# Patient Record
Sex: MALE | Race: BLACK OR AFRICAN AMERICAN | NOT HISPANIC OR LATINO | Employment: FULL TIME | URBAN - METROPOLITAN AREA
[De-identification: names, ages, dates, MRNs, and addresses within clinical notes are randomized per-mention and may not be internally consistent; named-entity substitution may affect disease eponyms.]

---

## 2017-12-29 ENCOUNTER — LAB VISIT (OUTPATIENT)
Dept: LAB | Facility: HOSPITAL | Age: 57
End: 2017-12-29
Attending: FAMILY MEDICINE
Payer: COMMERCIAL

## 2017-12-29 ENCOUNTER — OFFICE VISIT (OUTPATIENT)
Dept: FAMILY MEDICINE | Facility: CLINIC | Age: 57
End: 2017-12-29
Payer: COMMERCIAL

## 2017-12-29 VITALS
WEIGHT: 215.81 LBS | HEIGHT: 66 IN | TEMPERATURE: 98 F | HEART RATE: 92 BPM | DIASTOLIC BLOOD PRESSURE: 80 MMHG | SYSTOLIC BLOOD PRESSURE: 130 MMHG | RESPIRATION RATE: 16 BRPM | OXYGEN SATURATION: 96 % | BODY MASS INDEX: 34.68 KG/M2

## 2017-12-29 DIAGNOSIS — Z12.5 SCREENING FOR PROSTATE CANCER: ICD-10-CM

## 2017-12-29 DIAGNOSIS — Z12.11 COLON CANCER SCREENING: ICD-10-CM

## 2017-12-29 DIAGNOSIS — E66.09 CLASS 1 OBESITY DUE TO EXCESS CALORIES WITHOUT SERIOUS COMORBIDITY WITH BODY MASS INDEX (BMI) OF 34.0 TO 34.9 IN ADULT: ICD-10-CM

## 2017-12-29 DIAGNOSIS — Z00.00 ENCOUNTER FOR ROUTINE HISTORY AND PHYSICAL EXAM FOR MALE: Primary | ICD-10-CM

## 2017-12-29 DIAGNOSIS — Z00.00 ENCOUNTER FOR ROUTINE HISTORY AND PHYSICAL EXAM FOR MALE: ICD-10-CM

## 2017-12-29 LAB
ALBUMIN SERPL BCP-MCNC: 3.6 G/DL
ALP SERPL-CCNC: 65 U/L
ALT SERPL W/O P-5'-P-CCNC: 31 U/L
ANION GAP SERPL CALC-SCNC: 9 MMOL/L
AST SERPL-CCNC: 21 U/L
BASOPHILS # BLD AUTO: 0.02 K/UL
BASOPHILS NFR BLD: 0.4 %
BILIRUB SERPL-MCNC: 0.2 MG/DL
BUN SERPL-MCNC: 17 MG/DL
CALCIUM SERPL-MCNC: 9.6 MG/DL
CHLORIDE SERPL-SCNC: 108 MMOL/L
CHOLEST SERPL-MCNC: 254 MG/DL
CHOLEST/HDLC SERPL: 6.2 {RATIO}
CO2 SERPL-SCNC: 21 MMOL/L
CREAT SERPL-MCNC: 1.1 MG/DL
DIFFERENTIAL METHOD: ABNORMAL
EOSINOPHIL # BLD AUTO: 0.1 K/UL
EOSINOPHIL NFR BLD: 2.8 %
ERYTHROCYTE [DISTWIDTH] IN BLOOD BY AUTOMATED COUNT: 15.4 %
EST. GFR  (AFRICAN AMERICAN): >60 ML/MIN/1.73 M^2
EST. GFR  (NON AFRICAN AMERICAN): >60 ML/MIN/1.73 M^2
GLUCOSE SERPL-MCNC: 94 MG/DL
HCT VFR BLD AUTO: 40.8 %
HDLC SERPL-MCNC: 41 MG/DL
HDLC SERPL: 16.1 %
HGB BLD-MCNC: 13.7 G/DL
LDLC SERPL CALC-MCNC: 182.4 MG/DL
LYMPHOCYTES # BLD AUTO: 1.6 K/UL
LYMPHOCYTES NFR BLD: 34.2 %
MCH RBC QN AUTO: 26.4 PG
MCHC RBC AUTO-ENTMCNC: 33.6 G/DL
MCV RBC AUTO: 79 FL
MONOCYTES # BLD AUTO: 0.6 K/UL
MONOCYTES NFR BLD: 13.2 %
NEUTROPHILS # BLD AUTO: 2.3 K/UL
NEUTROPHILS NFR BLD: 49.2 %
NONHDLC SERPL-MCNC: 213 MG/DL
PLATELET # BLD AUTO: 289 K/UL
PMV BLD AUTO: 8.6 FL
POTASSIUM SERPL-SCNC: 3.8 MMOL/L
PROT SERPL-MCNC: 7.5 G/DL
RBC # BLD AUTO: 5.18 M/UL
SODIUM SERPL-SCNC: 138 MMOL/L
TRIGL SERPL-MCNC: 153 MG/DL
WBC # BLD AUTO: 4.71 K/UL

## 2017-12-29 PROCEDURE — 80061 LIPID PANEL: CPT

## 2017-12-29 PROCEDURE — 80053 COMPREHEN METABOLIC PANEL: CPT

## 2017-12-29 PROCEDURE — 36415 COLL VENOUS BLD VENIPUNCTURE: CPT | Mod: PN

## 2017-12-29 PROCEDURE — 83036 HEMOGLOBIN GLYCOSYLATED A1C: CPT

## 2017-12-29 PROCEDURE — 99386 PREV VISIT NEW AGE 40-64: CPT | Mod: S$GLB,,, | Performed by: FAMILY MEDICINE

## 2017-12-29 PROCEDURE — 99999 PR PBB SHADOW E&M-NEW PATIENT-LVL III: CPT | Mod: PBBFAC,,, | Performed by: FAMILY MEDICINE

## 2017-12-29 PROCEDURE — 85025 COMPLETE CBC W/AUTO DIFF WBC: CPT

## 2017-12-29 PROCEDURE — 84153 ASSAY OF PSA TOTAL: CPT

## 2017-12-29 NOTE — PROGRESS NOTES
CC:   Chief Complaint   Patient presents with    Annual Exam       The patient is a 57 y.o. Black or  male who presents to the office today for annual preventative health visit.    The primary encounter diagnosis was Encounter for routine history and physical exam for male. Diagnoses of Screening for prostate cancer, Colon cancer screening, and Class 1 obesity due to excess calories without serious comorbidity with body mass index (BMI) of 34.0 to 34.9 in adult were also pertinent to this visit.    57 year old male comes to clinic to establish care and complete annual wellness visit for insurance discount provided by his work.  Patient denies medical issues or concerns.  He does report Obesity and plans to restart his diet and exercise regimen.  In the past he reports losing 50 pounds ini 1 year with lifestyle modifications.    Lastly patient does report unprotected oral sex with other men but denies ever having been tested for HIV.  He would like confidentiality and patient reassured of this.  He reports that he will consider being tested in the future.    Patient Active Problem List   Diagnosis    Class 1 obesity due to excess calories without serious comorbidity with body mass index (BMI) of 34.0 to 34.9 in adult       History reviewed. No pertinent past medical history.    History reviewed. No pertinent surgical history.    No current outpatient prescriptions on file.    Review of patient's allergies indicates:  No known drug allergies.    Family History   Problem Relation Age of Onset    Diabetes Mother        Social History     Social History    Marital status: Single     Spouse name: N/A    Number of children: N/A    Years of education: N/A     Occupational History    Not on file.     Social History Main Topics    Smoking status: Current Every Day Smoker     Packs/day: 5.00     Types: Cigars    Smokeless tobacco: Never Used      Comment: black and mild    Alcohol use 1.2 oz/week      2 Standard drinks or equivalent per week    Drug use: No    Sexual activity: No     Other Topics Concern    Not on file     Social History Narrative    No narrative on file       Health Maintenance   Topic Date Due    Hepatitis C Screening  1960    TETANUS VACCINE  03/04/1978    Pneumococcal PPSV23 (Medium Risk) (1) 03/04/1978    Colonoscopy  03/04/2010    Influenza Vaccine  08/01/2017    Lipid Panel  12/29/2022       Patient Care Team:  Jennie Turner MD as PCP - General (Family Medicine)    DEPRESSION SCREENING  1. Over the past two weeks, have you felt down, depressed or hopeless?                      No  2. Over the past two weeks, have you felt little interest or pleasure in doing things?     No    Patient counseled on healthy diet and appropriate exercise regimen for cardiovascular health.  Patient instructed on use of seat belts, safety helmets, and sunscreen during sun exposure.    Patient counseled on healthy diet and appropriate exercise regimen for cardiovascular health.  Patient instructed on use of seat belts, safety helmets, and sunscreen during sun exposure.    Advanced directives: has NO advanced directive - not interested in additional information  EKG today: No  Pure tone audiometry today: No  Immunizations:   Influenza vaccine: declined  Pneumovax: NA  PCV13: NA  HPV: NA  Tdap: declined  Zostavax: NA  Recommended dental exam.  Recommended glaucoma screening by ophthalmologist or optometrist.  Colon cancer screen: COLONOSCOPY / ACBE / FLEX SIG / FOBT x 3.  Diabetes self-management training: No  Medical nutrition therapy for diabetes or renal disease: No  Abdominal aortic aneurysm screening: Abdominal ultrasound  No    Review of Systems   Constitutional: Negative for activity change.   HENT: Negative for congestion.    Respiratory: Negative for shortness of breath.    Cardiovascular: Negative for chest pain.   Gastrointestinal: Negative for abdominal pain.   Genitourinary:  "Negative for dysuria.   Musculoskeletal: Negative for gait problem.   Skin: Negative for rash.   Neurological: Negative for dizziness.   Psychiatric/Behavioral: Negative for suicidal ideas.         PHYSICAL EXAMINATION:    Vital signs: /80   Pulse 92   Temp 97.9 °F (36.6 °C) (Oral)   Resp 16   Ht 5' 6" (1.676 m)   Wt 97.9 kg (215 lb 13.3 oz)   SpO2 96%   BMI 34.84 kg/m²      Physical Exam   Constitutional: He is oriented to person, place, and time. Vital signs are normal. He appears well-developed.   HENT:   Right Ear: Hearing normal.   Left Ear: Hearing normal.   Mouth/Throat: Normal dentition.   Cardiovascular: Normal heart sounds and intact distal pulses.    Pulmonary/Chest: Effort normal and breath sounds normal.   Abdominal: Soft. Bowel sounds are normal. There is no tenderness.   Musculoskeletal:   Normal gait. No decreased ROM at all 4 major joints.   Neurological: He is oriented to person, place, and time. He has normal strength. No sensory deficit.   Skin: Skin is intact. No rash noted.   Psychiatric: He has a normal mood and affect. His speech is normal.       Assessment:    1. Encounter for routine history and physical exam for male    2. Screening for prostate cancer    3. Colon cancer screening    4. Class 1 obesity due to excess calories without serious comorbidity with body mass index (BMI) of 34.0 to 34.9 in adult        Tuan was seen today for annual exam.    Diagnoses and all orders for this visit:    Encounter for routine history and physical exam for male  -     CBC auto differential; Future  -     Comprehensive metabolic panel; Future  -     Lipid panel; Future  -     Hemoglobin A1c; Future  - Annual wellness visit completed.  Annual labs ordered, age appropriate cancer screening, and vaccinations reviewed.  - See health assessment above.    Screening for prostate cancer  -     PSA, Screening; Future    Colon cancer screening  -     Case request GI: COLONOSCOPY    Class 1 obesity " due to excess calories without serious comorbidity with body mass index (BMI) of 34.0 to 34.9 in adult   -Diet and exercise regimen reviewed with patient and goal of 20 pounds of weight loss in 6 months set.          FOLLOW UP: Return in about 1 year (around 12/29/2018) for Annual.

## 2017-12-29 NOTE — PATIENT INSTRUCTIONS

## 2017-12-30 LAB
COMPLEXED PSA SERPL-MCNC: 0.69 NG/ML
ESTIMATED AVG GLUCOSE: 120 MG/DL
HBA1C MFR BLD HPLC: 5.8 %

## 2017-12-31 PROBLEM — E66.09 CLASS 1 OBESITY DUE TO EXCESS CALORIES WITHOUT SERIOUS COMORBIDITY WITH BODY MASS INDEX (BMI) OF 34.0 TO 34.9 IN ADULT: Status: ACTIVE | Noted: 2017-12-31

## 2018-01-10 ENCOUNTER — TELEPHONE (OUTPATIENT)
Dept: FAMILY MEDICINE | Facility: CLINIC | Age: 58
End: 2018-01-10

## 2018-01-10 NOTE — TELEPHONE ENCOUNTER
----- Message from Jennie Turner MD sent at 1/9/2018  9:27 AM CST -----  Please contact patient and inform that I have reviewed his lab results.  Prostate testing was normal.  His Diabetes screen indicates that he his Prediabetic.  Kidney and liver function are normal.  Electrolytes are within normal range  Blood counts were normal.   Cholesterol testing shows very elevated cholesterol levels.  At this point he would need cholesterol medication but I recommend a 6 month trial of diet and exercise for weight loss and recheck his lipids at that point.  Please schedule 6 month follow up.